# Patient Record
Sex: FEMALE | Race: WHITE | NOT HISPANIC OR LATINO | Employment: STUDENT | URBAN - METROPOLITAN AREA
[De-identification: names, ages, dates, MRNs, and addresses within clinical notes are randomized per-mention and may not be internally consistent; named-entity substitution may affect disease eponyms.]

---
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
## 2020-02-06 ENCOUNTER — COMMUNICATION - HEALTHEAST (OUTPATIENT)
Dept: TELEHEALTH | Facility: CLINIC | Age: 20
End: 2020-02-06

## 2020-02-06 ENCOUNTER — OFFICE VISIT - HEALTHEAST (OUTPATIENT)
Dept: FAMILY MEDICINE | Facility: CLINIC | Age: 20
End: 2020-02-06

## 2020-02-06 DIAGNOSIS — Z23 ENCOUNTER FOR ADMINISTRATION OF VACCINE: ICD-10-CM

## 2020-02-06 DIAGNOSIS — Z30.46 NEXPLANON REMOVAL: ICD-10-CM

## 2020-05-06 ENCOUNTER — COMMUNICATION - HEALTHEAST (OUTPATIENT)
Dept: PEDIATRICS | Facility: CLINIC | Age: 20
End: 2020-05-06

## 2020-05-06 DIAGNOSIS — Z23 NEED FOR HPV VACCINATION: ICD-10-CM

## 2020-05-07 ENCOUNTER — AMBULATORY - HEALTHEAST (OUTPATIENT)
Dept: NURSING | Facility: CLINIC | Age: 20
End: 2020-05-07

## 2020-05-07 DIAGNOSIS — Z23 NEED FOR HPV VACCINATION: ICD-10-CM

## 2020-05-11 ENCOUNTER — COMMUNICATION - HEALTHEAST (OUTPATIENT)
Dept: LAB | Facility: CLINIC | Age: 20
End: 2020-05-11

## 2020-05-11 DIAGNOSIS — N91.2 AMENORRHEA: ICD-10-CM

## 2020-05-14 ENCOUNTER — OFFICE VISIT - HEALTHEAST (OUTPATIENT)
Dept: FAMILY MEDICINE | Facility: CLINIC | Age: 20
End: 2020-05-14

## 2020-05-14 DIAGNOSIS — N91.2 AMENORRHEA: ICD-10-CM

## 2020-05-14 DIAGNOSIS — Z33.1 PREGNANCY, INCIDENTAL: ICD-10-CM

## 2020-05-14 LAB — HCG UR QL: POSITIVE

## 2020-06-17 ENCOUNTER — COMMUNICATION - HEALTHEAST (OUTPATIENT)
Dept: FAMILY MEDICINE | Facility: CLINIC | Age: 20
End: 2020-06-17

## 2020-06-17 DIAGNOSIS — N91.2 AMENORRHEA: ICD-10-CM

## 2020-06-18 ENCOUNTER — HOSPITAL ENCOUNTER (OUTPATIENT)
Dept: ULTRASOUND IMAGING | Facility: CLINIC | Age: 20
Discharge: HOME | End: 2020-06-18
Attending: FAMILY MEDICINE

## 2020-06-18 ENCOUNTER — COMMUNICATION - HEALTHEAST (OUTPATIENT)
Dept: FAMILY MEDICINE | Facility: CLINIC | Age: 20
End: 2020-06-18

## 2020-06-18 DIAGNOSIS — N91.2 AMENORRHEA: ICD-10-CM

## 2020-07-01 ENCOUNTER — PRENATAL OFFICE VISIT - HEALTHEAST (OUTPATIENT)
Dept: FAMILY MEDICINE | Facility: CLINIC | Age: 20
End: 2020-07-01

## 2020-07-01 DIAGNOSIS — Z34.01 ENCOUNTER FOR SUPERVISION OF NORMAL FIRST PREGNANCY IN FIRST TRIMESTER: ICD-10-CM

## 2020-07-01 DIAGNOSIS — Q51.9 UTERINE ANOMALY: ICD-10-CM

## 2020-07-01 LAB
ALBUMIN UR-MCNC: NEGATIVE MG/DL
APPEARANCE UR: CLEAR
BASOPHILS # BLD AUTO: 0.1 THOU/UL (ref 0–0.2)
BASOPHILS NFR BLD AUTO: 1 % (ref 0–2)
BILIRUB UR QL STRIP: NEGATIVE
COLOR UR AUTO: YELLOW
EOSINOPHIL # BLD AUTO: 0.2 THOU/UL (ref 0–0.4)
EOSINOPHIL NFR BLD AUTO: 1 % (ref 0–6)
ERYTHROCYTE [DISTWIDTH] IN BLOOD BY AUTOMATED COUNT: 13.3 % (ref 11–14.5)
GLUCOSE UR STRIP-MCNC: NEGATIVE MG/DL
HCT VFR BLD AUTO: 38.1 % (ref 35–47)
HGB BLD-MCNC: 12.7 G/DL (ref 12–16)
HGB UR QL STRIP: NEGATIVE
HIV 1+2 AB+HIV1 P24 AG SERPL QL IA: NEGATIVE
KETONES UR STRIP-MCNC: NEGATIVE MG/DL
LEUKOCYTE ESTERASE UR QL STRIP: NEGATIVE
LYMPHOCYTES # BLD AUTO: 2.5 THOU/UL (ref 0.8–4.4)
LYMPHOCYTES NFR BLD AUTO: 20 % (ref 20–40)
MCH RBC QN AUTO: 31.9 PG (ref 27–34)
MCHC RBC AUTO-ENTMCNC: 33.3 G/DL (ref 32–36)
MCV RBC AUTO: 96 FL (ref 80–100)
MONOCYTES # BLD AUTO: 0.7 THOU/UL (ref 0–0.9)
MONOCYTES NFR BLD AUTO: 5 % (ref 2–10)
NEUTROPHILS # BLD AUTO: 8.9 THOU/UL (ref 2–7.7)
NEUTROPHILS NFR BLD AUTO: 72 % (ref 50–70)
NITRATE UR QL: NEGATIVE
PH UR STRIP: 7 [PH] (ref 5–8)
PLATELET # BLD AUTO: 206 THOU/UL (ref 140–440)
PMV BLD AUTO: 12.8 FL (ref 8.5–12.5)
RBC # BLD AUTO: 3.98 MILL/UL (ref 3.8–5.4)
SP GR UR STRIP: 1.01 (ref 1–1.03)
UROBILINOGEN UR STRIP-ACNC: NORMAL
WBC: 12.4 THOU/UL (ref 4–11)

## 2020-07-01 ASSESSMENT — MIFFLIN-ST. JEOR: SCORE: 1449.68

## 2020-07-02 LAB
ABO/RH(D): NORMAL
ABORH REPEAT: NORMAL
ANTIBODY SCREEN: NEGATIVE
BACTERIA SPEC CULT: NO GROWTH
C TRACH DNA SPEC QL PROBE+SIG AMP: NEGATIVE
HBV SURFACE AG SERPL QL IA: NEGATIVE
N GONORRHOEA DNA SPEC QL NAA+PROBE: NEGATIVE
RUBV IGG SERPL QL IA: NEGATIVE
T PALLIDUM AB SER QL: NEGATIVE

## 2020-07-03 ENCOUNTER — AMBULATORY - HEALTHEAST (OUTPATIENT)
Dept: MATERNAL FETAL MEDICINE | Facility: HOSPITAL | Age: 20
End: 2020-07-03

## 2020-07-03 DIAGNOSIS — O34.01 BICORNUATE UTERUS AFFECTING PREGNANCY IN FIRST TRIMESTER, ANTEPARTUM: ICD-10-CM

## 2020-07-03 DIAGNOSIS — Q51.3 BICORNUATE UTERUS AFFECTING PREGNANCY IN FIRST TRIMESTER, ANTEPARTUM: ICD-10-CM

## 2020-07-14 ENCOUNTER — PRE VISIT (OUTPATIENT)
Dept: MATERNAL FETAL MEDICINE | Facility: CLINIC | Age: 20
End: 2020-07-14

## 2020-07-16 ENCOUNTER — HOSPITAL ENCOUNTER (OUTPATIENT)
Dept: ULTRASOUND IMAGING | Facility: CLINIC | Age: 20
End: 2020-07-16
Attending: OBSTETRICS & GYNECOLOGY
Payer: COMMERCIAL

## 2020-07-16 ENCOUNTER — RECORDS - HEALTHEAST (OUTPATIENT)
Dept: ADMINISTRATIVE | Facility: OTHER | Age: 20
End: 2020-07-16

## 2020-07-16 ENCOUNTER — OFFICE VISIT (OUTPATIENT)
Dept: MATERNAL FETAL MEDICINE | Facility: CLINIC | Age: 20
End: 2020-07-16
Attending: OBSTETRICS & GYNECOLOGY
Payer: COMMERCIAL

## 2020-07-16 DIAGNOSIS — O34.01 BICORNUATE UTERUS AFFECTING PREGNANCY IN FIRST TRIMESTER, ANTEPARTUM: Primary | ICD-10-CM

## 2020-07-16 DIAGNOSIS — Q51.3 BICORNUATE UTERUS AFFECTING PREGNANCY IN FIRST TRIMESTER, ANTEPARTUM: Primary | ICD-10-CM

## 2020-07-16 DIAGNOSIS — O26.90 PREGNANCY RELATED CONDITION, ANTEPARTUM: ICD-10-CM

## 2020-07-16 PROCEDURE — 76801 OB US < 14 WKS SINGLE FETUS: CPT

## 2020-07-16 NOTE — PROGRESS NOTES
"Please see \"Imaging\" tab under \"Chart Review\" for details of today's US at the [facility].    Ben Rice MD  Maternal-Fetal Medicine      "

## 2020-07-29 ENCOUNTER — PRENATAL OFFICE VISIT - HEALTHEAST (OUTPATIENT)
Dept: FAMILY MEDICINE | Facility: CLINIC | Age: 20
End: 2020-07-29

## 2020-07-29 ENCOUNTER — RECORDS - HEALTHEAST (OUTPATIENT)
Dept: ADMINISTRATIVE | Facility: OTHER | Age: 20
End: 2020-07-29

## 2020-07-29 DIAGNOSIS — Q51.9 UTERINE ANOMALY: ICD-10-CM

## 2020-07-29 DIAGNOSIS — Z34.02 ENCOUNTER FOR SUPERVISION OF NORMAL FIRST PREGNANCY IN SECOND TRIMESTER: ICD-10-CM

## 2020-07-31 LAB
# FETUSES US: NORMAL
AFP MOM SERPL: 1.43
AFP SERPL-MCNC: 44 NG/ML
AGE - REPORTED: 21 YR
CURRENT SMOKER: NO
FAMILY MEMBER DISEASES HX: NO
GA METHOD: NORMAL
GA: NORMAL WK
IDDM PATIENT QL: NO
INTEGRATED SCN PATIENT-IMP: NORMAL
SPECIMEN DRAWN SERPL: NORMAL

## 2020-08-06 ENCOUNTER — COMMUNICATION - HEALTHEAST (OUTPATIENT)
Dept: FAMILY MEDICINE | Facility: CLINIC | Age: 20
End: 2020-08-06

## 2020-08-21 ENCOUNTER — COMMUNICATION - HEALTHEAST (OUTPATIENT)
Dept: FAMILY MEDICINE | Facility: CLINIC | Age: 20
End: 2020-08-21

## 2020-08-26 ENCOUNTER — HOSPITAL ENCOUNTER (OUTPATIENT)
Dept: ULTRASOUND IMAGING | Facility: CLINIC | Age: 20
Discharge: HOME | End: 2020-08-26
Attending: FAMILY MEDICINE

## 2020-08-26 ENCOUNTER — PRENATAL OFFICE VISIT - HEALTHEAST (OUTPATIENT)
Dept: FAMILY MEDICINE | Facility: CLINIC | Age: 20
End: 2020-08-26

## 2020-08-26 DIAGNOSIS — Z34.02 ENCOUNTER FOR SUPERVISION OF NORMAL FIRST PREGNANCY IN SECOND TRIMESTER: ICD-10-CM

## 2020-08-26 DIAGNOSIS — Q51.9 UTERINE ANOMALY: ICD-10-CM

## 2020-08-26 DIAGNOSIS — J30.1 SEASONAL ALLERGIC RHINITIS DUE TO POLLEN: ICD-10-CM

## 2020-08-27 ENCOUNTER — AMBULATORY - HEALTHEAST (OUTPATIENT)
Dept: FAMILY MEDICINE | Facility: CLINIC | Age: 20
End: 2020-08-27

## 2020-08-27 DIAGNOSIS — Q51.9 UTERINE ANOMALY: ICD-10-CM

## 2020-08-27 DIAGNOSIS — Z36.89 ENCOUNTER FOR FETAL ANATOMIC SURVEY: ICD-10-CM

## 2020-09-04 ENCOUNTER — COMMUNICATION - HEALTHEAST (OUTPATIENT)
Dept: SCHEDULING | Facility: CLINIC | Age: 20
End: 2020-09-04

## 2020-09-04 ENCOUNTER — OFFICE VISIT - HEALTHEAST (OUTPATIENT)
Dept: FAMILY MEDICINE | Facility: CLINIC | Age: 20
End: 2020-09-04

## 2020-09-04 DIAGNOSIS — O21.9 NAUSEA AND VOMITING DURING PREGNANCY: ICD-10-CM

## 2020-09-04 DIAGNOSIS — R10.84 GENERALIZED ABDOMINAL PAIN: ICD-10-CM

## 2020-09-04 LAB
ALBUMIN UR-MCNC: NEGATIVE MG/DL
ALT SERPL W P-5'-P-CCNC: 14 U/L (ref 0–45)
ANION GAP SERPL CALCULATED.3IONS-SCNC: 9 MMOL/L (ref 5–18)
APPEARANCE UR: CLEAR
APTT PPP: 25 SECONDS (ref 24–37)
AST SERPL W P-5'-P-CCNC: 18 U/L (ref 0–40)
BILIRUB UR QL STRIP: NEGATIVE
BUN SERPL-MCNC: 4 MG/DL (ref 8–22)
CALCIUM SERPL-MCNC: 8.9 MG/DL (ref 8.5–10.5)
CHLORIDE BLD-SCNC: 105 MMOL/L (ref 98–107)
CO2 SERPL-SCNC: 24 MMOL/L (ref 22–31)
COLOR UR AUTO: YELLOW
CREAT SERPL-MCNC: 0.6 MG/DL (ref 0.6–1.1)
CREAT UR-MCNC: 146.2 MG/DL
ERYTHROCYTE [DISTWIDTH] IN BLOOD BY AUTOMATED COUNT: 11.7 % (ref 11–14.5)
GFR SERPL CREATININE-BSD FRML MDRD: >60 ML/MIN/1.73M2
GLUCOSE BLD-MCNC: 85 MG/DL (ref 70–125)
GLUCOSE UR STRIP-MCNC: NEGATIVE MG/DL
HCT VFR BLD AUTO: 37.6 % (ref 35–47)
HGB BLD-MCNC: 12.7 G/DL (ref 12–16)
HGB UR QL STRIP: NEGATIVE
INR PPP: 0.96 (ref 0.9–1.1)
KETONES UR STRIP-MCNC: NEGATIVE MG/DL
LEUKOCYTE ESTERASE UR QL STRIP: NEGATIVE
LIPASE SERPL-CCNC: 20 U/L (ref 0–52)
MCH RBC QN AUTO: 32.7 PG (ref 27–34)
MCHC RBC AUTO-ENTMCNC: 33.7 G/DL (ref 32–36)
MCV RBC AUTO: 97 FL (ref 80–100)
NITRATE UR QL: NEGATIVE
PH UR STRIP: 7 [PH] (ref 5–8)
PLATELET # BLD AUTO: 234 THOU/UL (ref 140–440)
PMV BLD AUTO: 8.5 FL (ref 7–10)
POTASSIUM BLD-SCNC: 3.7 MMOL/L (ref 3.5–5)
PROTEIN, RANDOM URINE - HISTORICAL: 9 MG/DL
PROTEIN/CREAT RATIO, RANDOM UR: 0.06
RBC # BLD AUTO: 3.87 MILL/UL (ref 3.8–5.4)
SODIUM SERPL-SCNC: 138 MMOL/L (ref 136–145)
SP GR UR STRIP: 1.02 (ref 1–1.03)
URATE SERPL-MCNC: 4.1 MG/DL (ref 2–7.5)
UROBILINOGEN UR STRIP-ACNC: ABNORMAL
WBC: 11.7 THOU/UL (ref 4–11)

## 2020-09-14 ENCOUNTER — HOSPITAL ENCOUNTER (OUTPATIENT)
Dept: ULTRASOUND IMAGING | Facility: CLINIC | Age: 20
Discharge: HOME | End: 2020-09-14
Attending: FAMILY MEDICINE

## 2020-09-14 DIAGNOSIS — Q51.9 UTERINE ANOMALY: ICD-10-CM

## 2020-09-14 DIAGNOSIS — Z36.89 ENCOUNTER FOR FETAL ANATOMIC SURVEY: ICD-10-CM

## 2020-09-28 ENCOUNTER — PRENATAL OFFICE VISIT - HEALTHEAST (OUTPATIENT)
Dept: FAMILY MEDICINE | Facility: CLINIC | Age: 20
End: 2020-09-28

## 2020-09-28 DIAGNOSIS — Z34.02 ENCOUNTER FOR SUPERVISION OF NORMAL FIRST PREGNANCY IN SECOND TRIMESTER: ICD-10-CM

## 2020-10-14 ENCOUNTER — COMMUNICATION - HEALTHEAST (OUTPATIENT)
Dept: FAMILY MEDICINE | Facility: CLINIC | Age: 20
End: 2020-10-14

## 2020-10-14 DIAGNOSIS — Q51.9 UTERINE ANOMALY: ICD-10-CM

## 2020-10-21 ENCOUNTER — HOSPITAL ENCOUNTER (OUTPATIENT)
Dept: ULTRASOUND IMAGING | Facility: CLINIC | Age: 20
Discharge: HOME | End: 2020-10-21
Attending: FAMILY MEDICINE

## 2020-10-21 ENCOUNTER — RECORDS - HEALTHEAST (OUTPATIENT)
Dept: FAMILY MEDICINE | Facility: CLINIC | Age: 20
End: 2020-10-21

## 2020-10-21 DIAGNOSIS — Q51.9 UTERINE ANOMALY: ICD-10-CM

## 2020-11-03 ENCOUNTER — OFFICE VISIT - HEALTHEAST (OUTPATIENT)
Dept: FAMILY MEDICINE | Facility: CLINIC | Age: 20
End: 2020-11-03

## 2020-11-03 ENCOUNTER — COMMUNICATION - HEALTHEAST (OUTPATIENT)
Dept: FAMILY MEDICINE | Facility: CLINIC | Age: 20
End: 2020-11-03

## 2020-11-03 DIAGNOSIS — Q51.9 UTERINE ANOMALY: ICD-10-CM

## 2020-11-03 DIAGNOSIS — Z34.03 ENCOUNTER FOR SUPERVISION OF NORMAL FIRST PREGNANCY IN THIRD TRIMESTER: ICD-10-CM

## 2020-11-03 DIAGNOSIS — Z67.91 RH NEGATIVE STATE IN ANTEPARTUM PERIOD: ICD-10-CM

## 2020-11-03 DIAGNOSIS — O26.899 RH NEGATIVE STATE IN ANTEPARTUM PERIOD: ICD-10-CM

## 2020-11-03 LAB
ERYTHROCYTE [DISTWIDTH] IN BLOOD BY AUTOMATED COUNT: 10.3 % (ref 11–14.5)
FASTING STATUS PATIENT QL REPORTED: NORMAL
GLUCOSE 1H P 50 G GLC PO SERPL-MCNC: 116 MG/DL (ref 70–139)
HCT VFR BLD AUTO: 31.9 % (ref 35–47)
HGB BLD-MCNC: 10.8 G/DL (ref 12–16)
MCH RBC QN AUTO: 31.3 PG (ref 27–34)
MCHC RBC AUTO-ENTMCNC: 33.9 G/DL (ref 32–36)
MCV RBC AUTO: 92 FL (ref 80–100)
PLATELET # BLD AUTO: 234 THOU/UL (ref 140–440)
PMV BLD AUTO: 9.2 FL (ref 7–10)
RBC # BLD AUTO: 3.45 MILL/UL (ref 3.8–5.4)
WBC: 11.4 THOU/UL (ref 4–11)

## 2020-11-04 LAB — T PALLIDUM AB SER QL: NEGATIVE

## 2020-11-07 ENCOUNTER — COMMUNICATION - HEALTHEAST (OUTPATIENT)
Dept: FAMILY MEDICINE | Facility: CLINIC | Age: 20
End: 2020-11-07

## 2020-11-19 ENCOUNTER — COMMUNICATION - HEALTHEAST (OUTPATIENT)
Dept: FAMILY MEDICINE | Facility: CLINIC | Age: 20
End: 2020-11-19

## 2020-11-20 ENCOUNTER — PRENATAL OFFICE VISIT - HEALTHEAST (OUTPATIENT)
Dept: FAMILY MEDICINE | Facility: CLINIC | Age: 20
End: 2020-11-20

## 2020-11-20 DIAGNOSIS — N89.8 VAGINAL DISCHARGE: ICD-10-CM

## 2020-11-20 DIAGNOSIS — Q51.9 UTERINE ANOMALY: ICD-10-CM

## 2020-11-20 DIAGNOSIS — O26.899 RH NEGATIVE STATE IN ANTEPARTUM PERIOD: ICD-10-CM

## 2020-11-20 DIAGNOSIS — Z67.91 RH NEGATIVE STATE IN ANTEPARTUM PERIOD: ICD-10-CM

## 2020-11-20 DIAGNOSIS — Z34.03 ENCOUNTER FOR SUPERVISION OF NORMAL FIRST PREGNANCY IN THIRD TRIMESTER: ICD-10-CM

## 2020-11-20 LAB
CLUE CELLS: ABNORMAL
TRICHOMONAS, WET PREP: ABNORMAL
YEAST, WET PREP: ABNORMAL

## 2020-11-21 ENCOUNTER — COMMUNICATION - HEALTHEAST (OUTPATIENT)
Dept: FAMILY MEDICINE | Facility: CLINIC | Age: 20
End: 2020-11-21

## 2020-11-22 ENCOUNTER — AMBULATORY - HEALTHEAST (OUTPATIENT)
Dept: FAMILY MEDICINE | Facility: CLINIC | Age: 20
End: 2020-11-22

## 2020-11-22 DIAGNOSIS — B96.89 BACTERIAL VAGINOSIS IN PREGNANCY: ICD-10-CM

## 2020-11-22 DIAGNOSIS — O23.599 BACTERIAL VAGINOSIS IN PREGNANCY: ICD-10-CM

## 2020-12-03 ENCOUNTER — HOSPITAL ENCOUNTER (OUTPATIENT)
Dept: ULTRASOUND IMAGING | Facility: CLINIC | Age: 20
Discharge: HOME | End: 2020-12-03
Attending: FAMILY MEDICINE

## 2020-12-03 DIAGNOSIS — Q51.9 UTERINE ANOMALY: ICD-10-CM

## 2020-12-04 ENCOUNTER — PRENATAL OFFICE VISIT - HEALTHEAST (OUTPATIENT)
Dept: FAMILY MEDICINE | Facility: CLINIC | Age: 20
End: 2020-12-04

## 2020-12-04 DIAGNOSIS — R93.89 ABNORMAL ULTRASOUND: ICD-10-CM

## 2020-12-04 DIAGNOSIS — O36.5990 FETAL GROWTH RETARDATION, ANTEPARTUM: ICD-10-CM

## 2020-12-04 DIAGNOSIS — O26.899 RH NEGATIVE STATE IN ANTEPARTUM PERIOD: ICD-10-CM

## 2020-12-04 DIAGNOSIS — Z34.03 ENCOUNTER FOR SUPERVISION OF NORMAL FIRST PREGNANCY IN THIRD TRIMESTER: ICD-10-CM

## 2020-12-04 DIAGNOSIS — Z67.91 RH NEGATIVE STATE IN ANTEPARTUM PERIOD: ICD-10-CM

## 2020-12-04 DIAGNOSIS — Q51.9 UTERINE ANOMALY: ICD-10-CM

## 2020-12-07 ENCOUNTER — AMBULATORY - HEALTHEAST (OUTPATIENT)
Dept: MATERNAL FETAL MEDICINE | Facility: HOSPITAL | Age: 20
End: 2020-12-07

## 2020-12-07 DIAGNOSIS — O26.90 PREGNANCY, ANTEPARTUM, COMPLICATIONS: ICD-10-CM

## 2020-12-09 ENCOUNTER — COMMUNICATION - HEALTHEAST (OUTPATIENT)
Dept: FAMILY MEDICINE | Facility: CLINIC | Age: 20
End: 2020-12-09

## 2020-12-09 DIAGNOSIS — R12 HEARTBURN: ICD-10-CM

## 2020-12-09 DIAGNOSIS — O23.599 BACTERIAL VAGINOSIS IN PREGNANCY: ICD-10-CM

## 2020-12-09 DIAGNOSIS — B96.89 BACTERIAL VAGINOSIS IN PREGNANCY: ICD-10-CM

## 2020-12-09 RX ORDER — FAMOTIDINE 20 MG/1
20 TABLET, FILM COATED ORAL 2 TIMES DAILY
Qty: 60 TABLET | Refills: 1 | Status: SHIPPED | OUTPATIENT
Start: 2020-12-09

## 2020-12-10 ENCOUNTER — OFFICE VISIT - HEALTHEAST (OUTPATIENT)
Dept: MATERNAL FETAL MEDICINE | Facility: HOSPITAL | Age: 20
End: 2020-12-10

## 2020-12-10 ENCOUNTER — RECORDS - HEALTHEAST (OUTPATIENT)
Dept: ADMINISTRATIVE | Facility: OTHER | Age: 20
End: 2020-12-10

## 2020-12-10 ENCOUNTER — RECORDS - HEALTHEAST (OUTPATIENT)
Dept: ULTRASOUND IMAGING | Facility: HOSPITAL | Age: 20
End: 2020-12-10

## 2020-12-10 DIAGNOSIS — Q51.3 BICORNATE UTERUS COMPLICATING PREGNANCY, THIRD TRIMESTER: ICD-10-CM

## 2020-12-10 DIAGNOSIS — O26.90 PREGNANCY RELATED CONDITIONS, UNSPECIFIED, UNSPECIFIED TRIMESTER: ICD-10-CM

## 2020-12-10 DIAGNOSIS — O34.03 BICORNATE UTERUS COMPLICATING PREGNANCY, THIRD TRIMESTER: ICD-10-CM

## 2020-12-15 ENCOUNTER — COMMUNICATION - HEALTHEAST (OUTPATIENT)
Dept: FAMILY MEDICINE | Facility: CLINIC | Age: 20
End: 2020-12-15

## 2021-05-30 VITALS — WEIGHT: 142 LBS | HEIGHT: 64 IN | BODY MASS INDEX: 24.24 KG/M2

## 2021-05-31 VITALS — HEIGHT: 65 IN | BODY MASS INDEX: 26.76 KG/M2 | WEIGHT: 160.6 LBS

## 2021-05-31 VITALS — WEIGHT: 164 LBS | HEIGHT: 65 IN | BODY MASS INDEX: 27.32 KG/M2

## 2021-05-31 VITALS — HEIGHT: 65 IN | BODY MASS INDEX: 23.46 KG/M2 | WEIGHT: 140.8 LBS

## 2021-06-02 ENCOUNTER — RECORDS - HEALTHEAST (OUTPATIENT)
Dept: ADMINISTRATIVE | Facility: CLINIC | Age: 21
End: 2021-06-02

## 2021-06-04 VITALS
HEART RATE: 64 BPM | DIASTOLIC BLOOD PRESSURE: 72 MMHG | WEIGHT: 151 LBS | SYSTOLIC BLOOD PRESSURE: 100 MMHG | BODY MASS INDEX: 25.52 KG/M2

## 2021-06-04 VITALS
WEIGHT: 158 LBS | DIASTOLIC BLOOD PRESSURE: 58 MMHG | SYSTOLIC BLOOD PRESSURE: 122 MMHG | BODY MASS INDEX: 26.7 KG/M2 | HEART RATE: 96 BPM

## 2021-06-04 VITALS
HEART RATE: 76 BPM | BODY MASS INDEX: 27.36 KG/M2 | DIASTOLIC BLOOD PRESSURE: 64 MMHG | SYSTOLIC BLOOD PRESSURE: 110 MMHG | WEIGHT: 161.9 LBS

## 2021-06-04 VITALS
HEART RATE: 90 BPM | SYSTOLIC BLOOD PRESSURE: 119 MMHG | WEIGHT: 140 LBS | BODY MASS INDEX: 23.66 KG/M2 | DIASTOLIC BLOOD PRESSURE: 72 MMHG | OXYGEN SATURATION: 99 %

## 2021-06-04 VITALS
HEART RATE: 84 BPM | SYSTOLIC BLOOD PRESSURE: 92 MMHG | BODY MASS INDEX: 26.52 KG/M2 | DIASTOLIC BLOOD PRESSURE: 62 MMHG | WEIGHT: 156.9 LBS

## 2021-06-04 VITALS
HEART RATE: 76 BPM | DIASTOLIC BLOOD PRESSURE: 56 MMHG | SYSTOLIC BLOOD PRESSURE: 102 MMHG | BODY MASS INDEX: 26.16 KG/M2 | WEIGHT: 154.8 LBS

## 2021-06-04 VITALS
BODY MASS INDEX: 25.41 KG/M2 | HEART RATE: 72 BPM | DIASTOLIC BLOOD PRESSURE: 68 MMHG | SYSTOLIC BLOOD PRESSURE: 118 MMHG | HEIGHT: 65 IN | WEIGHT: 152.5 LBS

## 2021-06-05 VITALS
SYSTOLIC BLOOD PRESSURE: 116 MMHG | BODY MASS INDEX: 30.88 KG/M2 | HEART RATE: 96 BPM | DIASTOLIC BLOOD PRESSURE: 62 MMHG | WEIGHT: 182.7 LBS

## 2021-06-05 VITALS
SYSTOLIC BLOOD PRESSURE: 102 MMHG | HEART RATE: 84 BPM | WEIGHT: 177 LBS | BODY MASS INDEX: 29.91 KG/M2 | DIASTOLIC BLOOD PRESSURE: 64 MMHG

## 2021-06-05 NOTE — PROGRESS NOTES
Kat Wing is a 20 y.o. female here for Nexplanon removal.  It was inserted November 2017.  It is due for removal.  She is not interested in alternatives.  Not sexually active  Also requesting influenza and HPV vaccines    Vitals:    02/06/20 1301   BP: 100/72   Pulse: 64        Procedure note:  Counselling was done.  Side effects of Nexplanon again were reviewed with the patient.  Removal procedure techniques were reviewed with the patient.  Patient still desires for its removal.  Informed consent was obtained.  The area of the arm was cleaned with alcohol swab then anesthetized with lidocaine with epineprine.  A small incision was made with an 11 inch blade.  Then Nexplanon was palpated, held by a curved hemostat, and expressed outward towards the incision site.  The Nexplanon was completely removed.  Patient tolerated the procedure.  There were no complications.  Wound was dressed with antibiotic ointment and bandage.  We discussed alternative contraceptives and she is undecided at this point.

## 2021-06-08 NOTE — TELEPHONE ENCOUNTER
[facility] has a lab only appointment at [facility] today.  Please place orders ASAP if lab appointment is appropriate.  Thank you!

## 2021-06-08 NOTE — TELEPHONE ENCOUNTER
Reached out to patient and left a message to return phone call. When patient calls back, please see the below message and assist patient with scheduling an office visit with Dr. Campos. Also, please cancel the lab only and video visit. Thank you, Arianna Guerra

## 2021-06-08 NOTE — TELEPHONE ENCOUNTER
Thank you for coordinating this plan that we talked about. U/s in place. She can call the radiology department at [phone] to schedule her imaging test.

## 2021-06-08 NOTE — PROGRESS NOTES
Assessment/Plan:        Diagnoses and all orders for this visit:    Pregnancy, incidental  -     prenatal 93-iron-folate 9-dha 31 mg iron- 1 mg-200 mg cap; Take 1 capsule by mouth daily.  Dispense: 90 capsule; Refill: 3    Amenorrhea  -     Pregnancy, Urine    Other orders  -     UNABLE TO FIND; rootology for allergies  Discussed symptoms to watch for and inform us if it happens including PV bleeding or worse cramping.Will have see an OBGYN provider for prenatal care. She will be started on prenatal vitamins. Encouraged to stop the supplement except if she finds it is safe for pregnancy.   LMP was  04/02/20and her DEANN was 01/07/2021 with GA at 6 weeks today.        Subjective:    Patient ID: Kat Wing is a 20 y.o. female.    Comes wanting to confirm pregnancy. Noted last menstrual bleeding was April 2/20. She has no major symptoms at this time except for improving mild cramping and nausea with no vomiting. Has increased urination and frequency but no dysuria. Has no back pain. Good appetite.Pregnancy was expected.  Has history of allergy and uses a supplement called Rootology.      The following portions of the patient's history were reviewed and updated as appropriate: allergies, current medications, past family history, past medical history, past social history, past surgical history and problem list.    Review of Systems   Constitutional: Negative.    HENT: Positive for rhinorrhea. Negative for sinus pressure, sinus pain and sore throat.    Respiratory: Negative for cough and wheezing.    Neurological: Negative for light-headedness and headaches.   Psychiatric/Behavioral: Negative.      Vitals:    05/14/20 1513   BP: 119/72   Pulse: 90   SpO2: 99%   Weight: 140 lb (63.5 kg)             Objective:    Physical Exam   Constitutional: She is oriented to person, place, and time. She appears well-developed and well-nourished. No distress.   HENT:   Head: Normocephalic.   Cardiovascular: Intact distal pulses.    Neurological: She is alert and oriented to person, place, and time.   Skin: Skin is warm.

## 2021-06-08 NOTE — TELEPHONE ENCOUNTER
[facility] scheduled patient for a pregnancy confirmation with you on 05/12/2020. Patient requested a lab only visit today 05/11/2020 to leave a urine sample for a UPT. Please advise. Thank you, Arianna Guerra

## 2021-06-08 NOTE — TELEPHONE ENCOUNTER
Patient unable to do video visit due to being resident of [place]. Cancelled today's appointment and rescheduled the 1st OB to 7/1/2020 when Dr. Kapadia is back in clinic. She would still like to get a dating ultrasound done. I will send her the phone number to call and send her the address to the clinic for her 7/1 appointment so she comes to the correct clinic. Please place order for dating US.   Rosario Kohli LPN

## 2021-06-09 NOTE — PROGRESS NOTES
First OB Visit     ASSESSMENT/PLAN    11w5d by 1st tri ultrasound with DEANN 1/15/2021 which is discordant from apprx LMP dating     1. Encounter for supervision of normal first pregnancy in first trimester  Single mom, young but good family supports; she is declining care coordination at this time.  Consdering the Progenity testing  Routine prenatal labs today.  Pap smear is not yet indicated, age <21    - ABO/RH Typing (OP order)  - Hepatitis B Surface antigen (HBsAG)  - HIV Antigen/Antibody Screening [facility]  - HM1(CBC and Differential)  - HML Antibody Screen  - RPR  - Rubella Immune Status (IgG)  - Urinalysis Macroscopic  - Culture, Urine  - Chlamydia/gonorrhoeae, URINE  - HM1 (CBC with Diff)    2. 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  - Amb[facility]- Pregnancy      Referral(s):   None, declines referral for clinic care coordination/       Discussed:  - Pre-pregnancy Body mass index is 25.77 kg/m .  - Recommended weight gain of  25-35 lbs for normal BMI.  - Influenza vaccine to be done this fall  - Genetic screening options, including false positive rate of 5% with screening tests and diagnostic options (chorionic villus sampling, amniocentesis), and patient is going to research a little more.  - Safe medications during pregnancy and prenatal vitamin daily discussed.   - Healthy habits including not using tobacco or alcohol, exercising regularly and maintaining healthy diet  - Information given on tips for dealing with nausea, healthy habits, exposures, safety, prenatal appointment schedule, and when to call the doctor.  - Recommendations for breastfeeding given     Follow up in 4 weeks for routine pre- care.     Cora Kapadia MD        SUBJECTIVE:  Kat Presleygary is a 20 y.o.  female who presents to clinic  for a new OB visit.    Patient's last menstrual period was 2020 (within days).  Estimated Date of Delivery: 1/15/21 by 9w6d ultrasound, discordant from LMP    11w5d today    Unplanned pregnancy, she was on an OCP- thinks she missed one dose.    FOB is Alexys, 24yo (). Dating for 1year off and on. Now her ex-boyfriend. His family (parents) are aware and supportive. Her own family (parents, sisters, aunt/uncle) is also supportive.    She is a caretaker for people with mental disabilities.     She has not had any bleeding, abdominal pain or cramping since her LMP. She has  had mild nausea. No vomiting.  Weight loss has not occurred.     OTHER CONCERNS: none    Her obstetrical history is significant for none.   Relationship with FOB: ex boyfriend, not living together.   Patient does intend to breast feed.   Pregnancy history fully reviewed.    OB History    Para Term  AB Living   1             SAB TAB Ectopic Multiple Live Births                  # Outcome Date GA Lbr Rc/2nd Weight Sex Delivery Anes PTL Lv   1 Current                Social History     Social History Narrative     Not on file       Active Ambulatory Problems     Diagnosis Date Noted     Multiple Cranial Nerve Palsies      Allergic Rhinitis      Esophageal Reflux      Resolved Ambulatory Problems     Diagnosis Date Noted     Ankle Joint Pain      Ankle Sprain      No Additional Past Medical History       The following portions of the patient's history were reviewed and updated as appropriate: allergies, current medications, past family history, past medical history, past social history, past surgical history and problem list.    Review of Systems  A 12 point comprehensive review of systems was negative except as noted.      IMAGING:  EXAM: US OB < 14 WEEKS  LOCATION: [facility]  DATE/TIME: 2020 3:55 PM     INDICATION: Confirmed dating; LMP around 2020.  COMPARISON: None.  TECHNIQUE: Transabdominal scans were  "performed.     FINDINGS:  UTERUS: Single normal-appearing intrauterine gestation sac located towards the right cornu, although myometrium is seen surrounding the gestational sac. There is question of a right coronoid configuration or partially septate uterus.  CRL: measures 3.0 cm, equals 7 weeks 6 days.  FETAL HEART RATE: 170 bpm.  AMNIOTIC FLUID: Normal.  PLACENTA: Not yet formed. No evidence for sub-chorionic hemorrhage.     RIGHT OVARY: Normal.  LEFT OVARY: Normal.     IMPRESSION:   1.  Single living intrauterine gestation at 9 weeks 6 days, EDC January 15, 2021.  2.  Question bicornuate versus partially septate uterus with the intrauterine gestation towards the right horn. Consider short interval follow-up with repeat ultrasound in 2 weeks.      PHYSICAL EXAM:  /68 (Patient Site: Left Arm, Patient Position: Sitting, Cuff Size: Adult Regular)   Pulse 72   Ht 5' 4.5\" (1.638 m)   Wt 152 lb 8 oz (69.2 kg)   LMP 04/02/2020 (Within Days)   BMI 25.77 kg/m     GENERAL: Pleasant pregnant female, alert, well groomed.  SKIN: Warm and dry, without lesions or rashes  EYES: PERRLA, EOM intact  MOUTH: Buccal mucosa pink, moist without lesions.   NECK: Thyroid without enlargement and nodules. No cervical lymphadenopathy.  LUNGS: Clear to auscultation.  BREAST: declined  HEART: RRR without murmur.  ABDOMEN: Soft without masses , tenderness or organomegaly. No CVA tenderness. Fundus palpable at symphysis pubis. FHT visualized with bedside ultrasound along with fetal movement.  MUSCULOSKELETAL: Full range of motion.  EXTREMITIES: No edema. No significant varicosities.   : declined    Cora Kapadia MD            "

## 2021-06-09 NOTE — PATIENT INSTRUCTIONS - HE
1.  a generic prenatal vitamin at the pharmacy. It will have iron, 400mcg folic acid, and ideally also omega fatty acids for brain development.  2. Let us know if you decide you want the Progenity genetic blood test  3. Let us know if you do not hear back from the [facility] regarding our plan to get another ultrasound of your uterus to better review the shape.     It was nice to meet you today! Cora Kapadia MD          Patient Education   HEALTHY PREGNANCY CARE: 6 to 10 WEEKS PREGNANT    Pregnancy is an important time for you to take care of yourself and your baby. There is much that you can do through simple things like nutrition and exercise that will help you achieve the best outcome possible.     Learn about the changes you and your baby will experience during pregnancy. Your baby's facial features, brain, spinal cord and internal organs are developing, and baby's heart is pumping blood. Due to hormonal changes, you may notice nausea, fatigue or breast tenderness.    Common Discomforts of Early Pregnancy  Your body goes through many changes during pregnancy. Some are noticeable like increased breast size or darkening of the color of the nipple, but some changes may cause discomfort like breast tenderness, urinary frequency, fatigue or nausea. If you have questions about the duration or severity of what you are experiencing, contact your midwife or physician for guidance.     Coping with nausea/morning sickness    Sip small amounts of water, juices, or shakes. Try drinking between meals, not with meals.     Eat 5 or 6 small meals a day. Try dry toast, crackers, or cereal when you first get up, and eat breakfast a little later.    Make lelia tea (sliced lelia root in hot water with honey). Sip a cup in the morning before getting up.    Avoid spicy, greasy, and fatty foods.     When you feel sick, open your windows or go for a short walk to get fresh air.     Try nausea  wristbands. These help some women.     Call your midwife or physician if you cannot keep fluids down, or if vomiting persists. There are medications that can help.    Choose healthy foods and gain the recommended amount of weight for your size. If you have questions or follow a special diet, talk with your midwife or physician. You should take one prenatal vitamin daily.  If nausea is a problem, try taking only a folic acid supplement of 400-800mcg daily until the nausea passes.    Follow safe guidelines for exercise. Low impact aerobic activities are generally okay during pregnancy. If you have a regular exercise routine, you should be able to continue it during pregnancy as long as it doesn't cause pain. Talk to your midwife or physician about your activity at your prenatal visits.    You can sign up for a weekly parenting e-mail that gives support, tips and advice from health care professionals that starts with pregnancy and continues through the toddler years. To register, go to [email or web address] at any time during your pregnancy.    Things to Avoid During Pregnancy  A general principal to follow during pregnancy is to stay away from anything that is strong/bad smelling (gas, paint, fumes, etc), or known to cause problems for mom or baby    Smoking (self or others)    Alcohol     Pesticides    Caffeine     Soft cheeses    Fish with high mercury content (such as shark, swordfish, rupinder mackerel, or tilefish)    Some over the counter meds (ask your midwife or physician before taking)    Changing the gale litter box    This is also a good time to think about genetic screening tests. These are tests done during pregnancy to look for possible problems with the baby. First trimester tests for Down's Syndrome, Trisomy 13 and 18 can be done as early as 10 weeks of pregnancy. Some testing can be done as late as 22 weeks of pregnancy, depending on the test. There are other tests that look for spinal defects, cystic  fibrosis, Sriram-Sachs disease. Talk with your midwife or physician about testing.    Warning Signs    Watch for warning signs, such as     vaginal bleeding    fluid leaking from your vagina    severe abdominal pain    nausea and vomiting more than 4-5 times a day, or if you are unable to keep anything down    fever more than 100.4 degrees F.     Contact your midwife or physician at [facility] FAM[facility]ICINE/OB at Phone: [phone] if you have these or any other concerns. If it's after clinic hours, physician patients should call the [facility] at [phone] ([phone]); midwife patients should call their answering service at [phone].    How can you care for yourself at home?   You can refer to the Starting Out Right book or find it online at [email or web address]-[email or web address] or [email or web address]/images/[email or web address]/[email or web address]-[email or web address]-out-[email or web address]#p=8        Patient Education   HEALTHY PREGNANCY CARE: 10-14 WEEKS PREGNANT     By weeks 10 to 14 of your pregnancy, the placenta has formed inside your uterus. It may be possible to hear your baby's heartbeat with a doppler ultrasound device. Your baby's eyes can and do move. The arms and legs can bend.    The second trimester genetic screening tests for Down's Syndrome, Trisomy 18, and neural tube defects (which are known collectively as a quad screen) are done at 15 to 22 weeks. It's your choice whether to have these tests. You and your partner can talk to your midwife or physician about birth defects tests.    Consider breastfeeding for the healthiest way to feed your baby. Ask your midwife or physician for more information.     As your center of gravity and weight changes, use good body mechanics when changing positions and lifting. For example, use a straight back and your legs for support when lifting instead of bending over. Maintain good posture to  prevent straining your muscles. Now is a good time to continue or restart your exercise program. Walking 30-60 minutes daily is an excellent way to keep fit. Yoga and swimming also offer many benefits.    The nausea and fatigue of early pregnancy have usually started to let up, so this is a good time to focus on nutrition. Consider attending a nutrition class. A healthy diet includes about 60 grams of protein each day (3-4 servings of dairy, 2-3 servings of meat/fish/poultry/nuts), 4-6 servings of whole grain foods, and 5-6 servings of fruits and vegetables. Remember to drink 6-8 glasses of water daily.    Watch for warning signs, such as     vaginal bleeding    fluid leaking from your vagina    severe abdominal pain    nausea and vomiting more than 4-5 times a day, or if you are unable to keep anything down    fever more than 100.4 degrees F.     Contact your midwife or physician at [facility] FAMILY MEDICINE/OB at Phone: [phone] if you have these or any other concerns. If it's after clinic hours, physician patients should call the [facility] at [phone]); midwife patients should call their answering service at [phone].    How can you care for yourself at home?   You can refer to the Starting Out Right book or find it online at [email or web address]/stories/[email or web address]-[email or web address] or [email or web address].[email or web address]/images/stories/flipbooks/[facility]-starting-out-right/[facility]-starting-out-right.html#p=8  You can sign up for a weekly parenting e-mail that gives support, tips and advice from health care professionals that starts with pregnancy and continues through the toddler years. To register, go to [email or web address] at any time during your pregnancy.

## 2021-06-10 NOTE — TELEPHONE ENCOUNTER
Called pt to try and left brief VM to call back.    Please relay message if she calls:     You are going to have a baby GIRL!!  I want to also give you the good news that your Progenity genetic blood test was NORMAL and so was the pre-parent carrier screening!   This puts your current pregnancy at low risk for Down syndrome, trisomy 18, trisomy 13 and sex chromosome abnormalities. This test is reported to have the following sensitivities: Down syndrome: 99%, trisomy 18: 98%, and trisomy 13: 98%. While this result is reassuring, it does not rule out all possible genetic conditions. There is not currently one single genetic test available that can assess for all possible genetic conditions.       Thanks,  Cora Kapadia MD

## 2021-06-10 NOTE — PROGRESS NOTES
"Chief complaint: Allergy follow-up    History of present illness: This is a pleasant 17-year-old girl here with her mom for follow-up of her allergies.  She reports that she takes montelukast and Allegra as needed.  She does not use nasal sprays or rinses.  Mom states she got a bloody nose when she is nasal sprays in the past.  No cough, wheeze or shortness of breath.  Her nasal congestion has been increased since she has been out of her montelukast.    Past medical history, social history, family medical history, meds and allergies reviewed and updated accordingly.    Review of Systems performed as above and the remainder is negative.      Current Outpatient Prescriptions:      etonogestrel 68 mg Impl, Use as directed in the clinic today, Disp: 1 each, Rfl: 0     montelukast (SINGULAIR) 10 mg tablet, Take 1 tablet (10 mg total) by mouth at bedtime., Disp: 90 tablet, Rfl: 3    No Known Allergies    Resp 18  Ht 5' 4\" (1.626 m)  Wt 142 lb (64.4 kg)  BMI 24.37 kg/m2  Gen: Pleasant female not in acute distress  HEENT: Eyes no erythema of the bulbar or palpebral conjunctiva, no edema. Ears: TMs well visualized, no effusions. Nose: Inferior turbinates congested, pale mucosa, vestibulitis mouth: Throat clear, no lip or tongue edema.       Skin: No rashes or lesions  Psych: Alert and oriented times 3      Impression report and plan:  1.  Allergic rhinitis    Continue montelukast and antihistamine.  Notify of increased symptoms.  Suggested the use of sinus rinses.  She can follow as needed and with her primary care doctor in the future for refills.  "

## 2021-06-10 NOTE — PROGRESS NOTES
GENTRY  15w5d  Estimated Date of Delivery: 1/15/21    Chief Complaint   Patient presents with     Routine Prenatal Visit     15 weeks       S: Morning sickness resolved a few weeks ago. Now having more of an appetite.  She indicates she may be needing to transfer care up to [facility] if she takes a job opportunity up there.  Her father's aunt is ill and potentially needs a caretaker there.    Set up with [facility]IC already.  Her ex  Boyfriend Alexys texted her intermittently to try to be involved, however it sounds like he is also dating somebody else now and she is frustrated about this and does not need his help if he is not going to be involved.  She indicates she has good family support from both her own side and Alexys's family.    Interested to try breastfeeding but other family members haven't had as much success (her mom had low milk supply, grandma with unknown reason)    No vaginal bleeding, cramping, LOF.   No headaches, vision change, RUQ abdominal pain, LE swelling.       O: Vitals reviewed, see prenatal flowsheet for measurements.   appears well, no acute distress.  Normal respiratory effort.  Abdomen is soft. No LE swelling.     A/P:     Encounter for supervision of normal first pregnancy in second trimester  Single mom, young but good family supports; she is declining care coordination at this time.  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 by [facility] u/s on 7/16/2020   - intrauterine gestation at right horn  - [facility] recommends q4-6 weeks growth assessments  - Postpartum MRI recommended to eval for uterine structure   - Risk for malpresentation, fetal growth restriction reviewed    COVID-19 counseling:   - recommend iron supplementation due to blood shortage  - avoid travel, social distancing + hand  hygiene, avoid visitors at home  - current breastfeeding recommendations  - team based prenatal care by our [facility] group  - delivering physician may change  - visitor policy for imaging tests, office visits & L&D  - what to do if symptoms ([email or web address] or call the triage team at [phone])    Follow up: 4 weeks     Cora Kapadia MD
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
## 2021-06-11 NOTE — TELEPHONE ENCOUNTER
Patient calling, concerned that she is having a reaction to a nasal decongestant she started 2 weeks ago.    For the last 3 days, she has thrown up all food she has eaten.  She eats a meal a reported 5-6 times a day and snacks in between.  20-45 minutes after eating, everything comes back up.  She is having good urine output she reports, even when just drinking and it feels like everything comes back up.    Also of note, last night woke up as had a very bad sharp pain in side and that pain lasted for 1.5 hours.  This pain has not come back again.  Patient denies chills or any other symptoms.    Called clinic and spoke with team and Dr. Kapadia, advised that patient come to the clinic and they will see her.    Cora Morelos RN 09/04/20 1:57 PM           Reason for Disposition    MODERATE vomiting (e.g., 5 - 10 times / day) and present > 3 days    Additional Information    Negative: Vaginal bleeding and pregnant < 20 weeks    Negative: Abdominal pain and pregnant < 20 weeks    Negative: Headache is the main complaint    Negative: Sounds like a life-threatening emergency to the triager    Negative: Vomiting red blood or black (coffee ground) material    Negative: Insulin-dependent diabetes (Type I) and glucose > 400 mg/dL (22 mmol/L)    Negative: Recent head injury (within last 3 days)    Negative: Recent abdominal injury (within last 3 days)    Negative: Severe pain in one eye    Negative: SEVERE vomiting (e.g., > 10 times / day) and present > 8 hours    Negative: Unable to keep ANY liquids down (without vomiting) this past 24 hours    Negative: Weight loss > 5 pounds (2.5 kg) in last 2 weeks    Negative: Drinking very little and has signs of dehydration (e.g., no urine > 12 hours, very dry mouth)    Negative: Patient sounds very sick or weak to the triager    Protocols used: PREGNANCY - MORNING SICKNESS (NAUSEA AND VOMITING OF PREGNANCY)-A-[place]

## 2021-06-11 NOTE — PATIENT INSTRUCTIONS - HE
"Breast Pumps:  [email or web address]  Click on the pink button at top right of the webpage for the \"fast track order form\" and use the breast pump prescription that we provided today for this process.    \"[employer] [facility] is an accredited and authorized DME provider specializing in breast pumps and insurance billing. Most insurance plans cover a breast pump following the Affordable Care Act guidelines. We do all the hard work of contacting your insurance to verify your coverage and eligibility. We ship, free of charge, nation wide via UPS (1-5 business days) for all pump orders!\"          Childbirth Classes     [facility]  [email or web address]     Typically new parents consider the four main classes, but there are many others to choose from on the website:  1. Preparing for Childbirth $120  2. Breastfeeding $40  3. Newborns 101 $40  4. Infant CPR  $70      [facility]                                                [email or web address]  [phone]  [facility] providing pregnancy, labor and parenting support  Affordable birth classes, potentially covered by insurance        "

## 2021-06-11 NOTE — PROGRESS NOTES
ASSESSMENT:  1. Routine general medical examination at a health care facility  - Tdap vaccine,  8yo or older,  IM  - Meningococcal MCV4P  - Chlamydia trachomatis & Neisseria gonorrhoeae, Amplified Detection  - TB Skin Test; Future  - Glucose  - Lipid Cascade  - TB Skin Test    2. Intestinal malabsorption, unspecified type    PLAN:  There are no Patient Instructions on file for this visit.    Orders Placed This Encounter   Procedures     Chlamydia trachomatis & Neisseria gonorrhoeae, Amplified Detection     Order Specific Question:   Specimen Source?     Answer:   Urine     Tdap vaccine,  8yo or older,  IM     Order Specific Question:   Counseling provided to include answering patients questions and/or preemptively discussing the risks and benefits of all components.     Answer:   Yes     Meningococcal MCV4P     Order Specific Question:   Counseling provided to include answering patients questions and/or preemptively discussing the risks and benefits of all components.     Answer:   Yes     Glucose     Lipid Cascade     Order Specific Question:   Fasting is required?     Answer:   Yes     TB Skin Test     Standing Status:   Future     Number of Occurrences:   1     Standing Expiration Date:   7/9/2017     There are no discontinued medications.    No Follow-up on file.     Will get labs as part of a routine physical exam. Reviewed health maintenance with patient. Patient will get meningococcal and tetanus shots today. Patient will discuss with her parents about getting a HPV vaccine. Form will be completed for patient's work.     CHIEF COMPLAINT:  Chief Complaint   Patient presents with     Annual Exam     pt is fasting, has form for work       HISTORY OF PRESENT ILLNESS:  Kat is a 17 y.o. female presenting to the clinic today for an annual exam. She is fasting today. She has a form that she would like to have completed for work at a .     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   She takes montelukast as needed when her allergies act up. She has seasonal allergies at the beginning of spring and end of fall. She has tried nasal sprays in the past, but they cause her to have nose bleeds.   She last saw an eye doctor a couple years ago. She wears glasses.    She does not think that she snores. She denies chest pain and shortness of breath. She does not have constipation or diarrhea. She does not have swelling in her lower extremities. She denies knee and ankle pain. She does not have any urinary symptoms. All other systems are negative.    PFSH:  Family: She does NOT have a family history of heart disease. Her parents are doing well.   Social: She is a anh in high school. She will be working at a  over the summer.     Reviewed, as below.    History   Smoking Status     Never Smoker   Smokeless Tobacco     Never Used       Family: No pertinent past family history.     Social History     Social History     Marital status: Single     Spouse name: N/A     Number of children: N/A     Years of education: N/A     Occupational History     Not on file.     Social History Main Topics     Smoking status: Never Smoker     Smokeless tobacco: Never Used     Alcohol use Not on file     Drug use: Not on file     Sexual activity: Not on file     Other Topics Concern     Not on file     Social History Narrative     Surgical: No pertinent past surgical history.     No Known Allergies    Active Ambulatory Problems     Diagnosis Date Noted     Ankle Joint Pain      Ankle Sprain      Multiple Cranial Nerve Palsies      Allergic Rhinitis      Esophageal Reflux      Resolved Ambulatory Problems     Diagnosis Date Noted     No Resolved Ambulatory Problems     No Additional Past Medical History       Current Outpatient Prescriptions   Medication Sig Dispense Refill     etonogestrel 68 mg Impl Use as directed in the clinic today 1 each 0     montelukast  "(SINGULAIR) 10 mg tablet Take 1 tablet (10 mg total) by mouth at bedtime. 90 tablet 3     No current facility-administered medications for this visit.        VITALS:  Vitals:    06/09/17 1300   BP: 100/60   Pulse: 76   Weight: 140 lb 12.8 oz (63.9 kg)   Height: 5' 4.5\" (1.638 m)     Wt Readings from Last 3 Encounters:   06/09/17 140 lb 12.8 oz (63.9 kg) (78 %, Z= 0.77)*   05/19/17 142 lb (64.4 kg) (79 %, Z= 0.81)*   11/23/15 144 lb (65.3 kg) (84 %, Z= 0.99)*     * Growth percentiles are based on [facility] 2-20 Years data.     Body mass index is 23.8 kg/(m^2).    PHYSICAL EXAM:  General Appearance: Alert, cooperative, no distress, appears stated age  Head: Normocephalic, without obvious abnormality, atraumatic. No sinus tenderness.   Eyes: Pupils equal, symmetric  Ears: Normal TMs and external ear canals, both ears  Nose: Nares normal, septum midline, mucosa normal.   Throat: Lips, mucosa, and tongue normal; teeth and gums normal  Neck: Supple, symmetrical, trachea midline, no adenopathy;  thyroid: not enlarged, symmetric, no tenderness/mass/nodules  Lungs: Clear to auscultation bilaterally, respirations unlabored  Heart: Regular rate and rhythm, S1 and S2 normal, no murmur, rub, or gallop  Abdomen: Soft, non-tender, bowel sounds active all four quadrants, no masses, no organomegaly  Musculoskeletal: Normal range of motion. No joint swelling or deformity.   Extremities normal, atraumatic, no cyanosis or edema  Lymph nodes: Cervical nodes normal  Neurologic:  Alert and oriented times 3. Normal reflexes. Cranial nerves II-XII intact.   Psychiatric: Normal mood and affect.      ADDITIONAL HISTORY SUMMARIZED (2): None.  DECISION TO OBTAIN EXTRA INFORMATION (1): None.   RADIOLOGY TESTS (1): None.  LABS (1): Ordered labs.   MEDICINE TESTS (1): None.  INDEPENDENT REVIEW (2 each): None.       The visit lasted a total of 21 minutes face to face with the patient. Over 50% of the time was spent counseling and educating the patient " about health maintenance and overall health.    I, German Gill, am scribing for and in the presence of, Dr. Campos.    I, Dr. Campos, personally performed the services described in this documentation, as scribed by German Gill in my presence, and it is both accurate and complete.    MEDICATIONS:  Current Outpatient Prescriptions   Medication Sig Dispense Refill     etonogestrel 68 mg Impl Use as directed in the clinic today 1 each 0     montelukast (SINGULAIR) 10 mg tablet Take 1 tablet (10 mg total) by mouth at bedtime. 90 tablet 3     No current facility-administered medications for this visit.        Total data points: 1

## 2021-06-11 NOTE — PROGRESS NOTES
GENTRY  24w3d    Estimated Date of Delivery: 1/15/21    Chief Complaint   Patient presents with     Routine Prenatal Visit     24 weeks     S:  Doing well overall. Working a lot of hours.  She is going to take breastfeeding classes and parenting classes through her mom's work- November.   Her ex boyfriend is still not involved; doesn't want to be. She reports he took out a $13,000 loan for his truck so he can do some renovations there, but is not interested to care for the baby.  She does have plans for paternity testing and filing for full custody down the road.    No vaginal bleeding, cramping, LOF.   No headaches, vision change, RUQ abdominal pain, LE swelling.       O: Vitals reviewed, see prenatal flowsheet for measurements.   appears well, no acute distress.  Normal respiratory effort.   TraceLE swelling.     A/P:     Encounter for supervision of normal first pregnancy in second trimester  - Social: Single mom, young but good family supports; she is declining care coordination at this time, safe at home; FOB is Alexys; looking into paternity testing as they plan to go to court for custody   - Carrier screening, Progenity & MSAFP testing-  normal  - Expecting baby girl!  - Breastfeeding, Rx given   - Flu shot given  - O negative, will NEED rhogam @ 28wks   - anatomy scan repeated on 9/14, still sub optimal imaging of fetal profile but otherwise reassuring eval.  IMPRESSION:   1.  Single living intrauterine gestation.  2.  Based on first ultrasound, composite age of 22 weeks 3 days with EDC 1/15/2021.  3.  Normal interval growth.  4.  Normal-appearing intracranial structures, nose and lips. Suboptimal views of fetal profile due to fetal positioning.    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    COVID-19 counseling:   - recommend iron supplementation due to blood shortage  - avoid travel, social distancing + hand hygiene, avoid visitors at home  - current breastfeeding recommendations  - team based prenatal care by our [facility] group  - delivering physician may change  - visitor policy for imaging tests, office visits & L&D  - what to do if symptoms ([email or web address] or call the triage team at [phone])    Follow up: 4 weeks: 1hr GTT, CBC, syphilis, Rhogam, Tdap    Cora Kapadia MD

## 2021-06-11 NOTE — PROGRESS NOTES
GENTRY  21w0d    Estimated Date of Delivery: 1/15/21    Chief Complaint   Patient presents with     Emesis     since taking a decongestant she has not been able to keep anything down     S:    Patient called triage today and I advised coming in: concerned that she is having a reaction to a decongestant she started 2 weeks ago. I had advised flonase for her allergies and she also picked up sudafed. A few hours after she took it on Tuesday night she couldn't keep her food down. On Wednesday, when she eats or drinks and 45min later food comes back up.  On Thursday she was able to keep a little apple juice and a granola bar down.  Her last dose of Sudafed was Thursday evening.  Today is Friday. She tried to eat breakfast this AM and couldn't keep it down. She did have 1/2 bottle of water and some crackers on her way over and now this is staying down.     She reports her appetite is normal and would like to be eating and had attempted to eat about 5 or 6 times throughout the day on Wednesday but  20-45 minutes after eating, everything comes back up.  She is having good urine output she reports, even when just drinking and it feels like everything comes back up.      No bilious emesis; no hematemasis.  No diarrhea.   No fevers.   NO heartburn or reflux.    She does have some right flank pain, epigastric discomfort and sometimes with a headache; currently gone again.  Blood pressure here is normal.  No change in taste or smell.   No known COVID exposures.  She denies any vaginal discharge or urinary symptoms.     Also of note, last night woke up as had a very bad right sided sharp pain in side and that pain lasted for 1.5 hours.  This pain has not come back again.    Patient denies chills or any other symptoms.    No vaginal bleeding, cramping, LOF.   No headaches, vision change, RUQ abdominal pain, LE swelling.       O: Vitals reviewed, see prenatal flowsheet for measurements.   appears well, no acute distress.  Normal  respiratory effort.  Abdomen is soft, nondistended; normal bowel sounds, negative for guarding or rebound.  Negative Ramirez's sign, negative psoas sign.. No LE swelling.     A/P:     Nausea & vomiting with epigastric/right abdominal pain (this is currently resolved)  Nausea and vomiting associated with p.o. intake.  Differential reviewed with patient including possibility of med reaction as symptoms started acutely after her first dose of Sudafed and are now subsiding today as her last dose of Sudafed was yesterday evening.  She has not thrown up since this morning.  Some of the afternoon.  Given her symptoms I will start with some help labs, liver tests, lipase, and a UA just to rule out infection.  She is aware of symptoms and signs to present to the ER for more urgent evaluation but at this point I will hold off on any abdominal ultrasound imaging.  She denies any reflux or heartburn, no bilious or bloody emesis.  Given the absence of other symptoms at this point I would not recommend COVID screening in the setting of this medication exposure.    Encounter for supervision of normal first pregnancy in second trimester  - Social: Single mom, young but good family supports; she is declining care coordination at this time, safe at home; FOB is Alexys; looking into paternity testing as they plan to go to court for custody   - Desires Carrier screening, Progenity & MSAFP testing- these all returned normal  - Expecting baby girl!  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    COVID-19 counseling:   - recommend iron supplementation due to blood shortage  - avoid travel, social distancing + hand hygiene, avoid visitors at home  - current breastfeeding recommendations  - team based prenatal care by our [facility] group  - delivering physician may change  - visitor policy for imaging tests, office visits & L&D  - what to do if symptoms ([email or web address] or call the triage team at [phone])    Follow up: 4 weeks     Cora Kapadia MD

## 2021-06-12 NOTE — PROGRESS NOTES
"Kat Loera is a 20 y.o. female who is being evaluated via a billable video visit.      The patient has been notified of following:     \"This video visit will be conducted via a call between you and your physician/provider. We have found that certain health care needs can be provided without the need for an in-person physical exam.  This service lets us provide the care you need with a video conversation.  If a prescription is necessary we can send it directly to your pharmacy.  If lab work is needed we can place an order for that and you can then stop by our lab to have the test done at a later time.    Video visits are billed at different rates depending on your insurance coverage. Please reach out to your insurance provider with any questions.    If during the course of the call the physician/provider feels a video visit is not appropriate, you will not be charged for this service.\"    Patient has given verbal consent to a Video visit? Yes  How would you like to obtain your AVS? AVS Preference: [email or web address].  If dropped by the video visit, the video invitation should be sent to: Text to cell phone: [phone]  Will anyone else be joining your video visit? No          "

## 2021-06-12 NOTE — PROGRESS NOTES
VIDEO VISIT  Due to current COVID19 pandemic, pt was offered virtual visit in lieu of   an in office evaluation to limit exposures.      GENTRY  29w4d  Estimated Date of Delivery: 1/15/21    Chief Complaint   Patient presents with     Routine Prenatal Visit     29 weeks- would like to know risks in pregnancy if she were to get COVID- 1 hour GTT     Converted to virtual visit today as provider was required to be on virtual care today.    S: Doing well. She had some questions with regard to COVID in pregnancy that we reviewed today. She has no sx of COVID and no known exposures.    Planning for 1hr GTT and rhogam/Tdap today.    + fetal movement.   Denies vaginal bleeding, cramping, LOF.   No headaches, RUQ pain, vision change or LE swelling.    Objective:  Vitals - Patient Reported  Systolic (Patient Reported): 94  Diastolic (Patient Reported): 54  Blood pressure taken with manual cuff (will exclude from quality measure): Yes  Weight (Patient Reported): 169 lb 1.6 oz (76.7 kg)  Pulse (Patient Reported): 88  Vitals were obtained in clinic (Pt was there physically for her GTT test and BP checked/normal)  Fundal height and FHTs not done in context of virtual visit otherwie  Appears well, no acute distress.  Normal respiratory effort.  Abdomen is soft. No LE swelling.       A/P:     Encounter for supervision of normal first pregnancy in third trimester  - Social: Single mom, young but good family supports; she is declining care coordination at this time, safe at home; FOB is Alexys; looking into paternity testing as they plan to go to court for custody   - Carrier screening, Progenity & MSAFP testing-  normal  - Expecting baby girl!  - Breastfeeding, Rx given   - Flu shot given  - O negative, rhogam given 11/3  - anatomy scan completed, eventually we have seen full fetal profile on follow ups   - GTT, CBC, Sypillis, Tdap and Rhogam today  - next: child birth classes?    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 u/s on 7/16/2020; intrauterine gestation at right horn  - [facility] recommends q4-6 weeks growth assessments     -  9/14/2020 with EFW 27%ile     - 10/21/2020 with EFW 39%ile  - Postpartum MRI recommended to eval for uterine structure   - Risk for malpresentation, fetal growth restriction reviewed     Allergic rhinitis with additional component of pregnancy rhinitis   Trial of flonase is helping; ok to continue this.     COVID-19 counseling:   - recommend iron supplementation due to blood shortage  - avoid travel, social distancing + hand hygiene, avoid visitors at home  - current breastfeeding recommendations  - team based prenatal care by our [facility] group  - delivering physician may change  - visitor policy for imaging tests, office visits & L&D  - what to do if symptoms ([email or web address] or call the triage team at [phone])    Follow up: 2 weeks in person.     Video-Visit Details- see CA note for consent  Video visit start time: 3:10pm  Video visit end time: 3:17pm  Total time: 7min  Originating Location (pt. Location): Home  Distant Location (provider location):  [facility]   Mode of Communication:  Video Conference via [employer]    Cora Kapadia MD    Future Appointments   Date Time Provider Department Center   11/20/2020  1:40 PM Cora Kapadia MD [facility]   12/4/2020  1:40 PM Cora Kapadia MD [facility]   12/17/2020  2:00 PM Cora Kapadia MD [facility]   12/22/2020  2:00 PM Cora Kapadia MD [facility]   12/30/2020 11:40 AM Cora Kapadia MD [facility]   1/5/2021  2:00 PM Cora Kapadia MD [facility]   1/13/2021 12:00 PM Cora Kapadia MD [facility]   1/22/2021  2:00 PM Cora Kapadia MD [facility]

## 2021-06-13 NOTE — PROGRESS NOTES
"  Office Visit - Follow Up   Kat Wing   17 y.o. female    Date of Visit: 10/23/2017    Chief Complaint   Patient presents with     Follow-up     was informed mono test was positive   [facility]        Assessment and Plan   1. Mononucleosis  Explained to patient that there is a risk complications which include splenic rupture on airway obstructions.  Encourage patient to continue to observe no contact sports, rest, increase hydration and use analgesics.      History of Present Illness   This 17 y.o. old female patient who presented to the clinic today for follow-up after being seen at the emergency department and was diagnosed with mononucleosis.  She was also negative for strep.  Patient was informed to follow-up with her PCP for spleen check to avoid rupture.  Denied any other symptoms including abdominal discomfort, tenderness around her upper left quadrant, fever, andchills, she reported that she is feeling much better.    Review of Systems: A 12 point comprehensive review of systems was negative except as noted.     Medications, Allergies and Problem List   Reviewed and updated     Physical Exam   General Appearance: Well groomed    /70  Pulse 76  Temp 98.5  F (36.9  C) (Oral)   Ht 5' 4.5\" (1.638 m)  Wt 160 lb 9.6 oz (72.8 kg)  SpO2 98%  BMI 27.14 kg/m2  Physical Examination: General appearance - alert, well appearing, and in no distress  Eyes: pupils equal and reactive, extraocular eye movements intact  Mouth: mucous membranes moist, pharynx normal without lesions  Neck: supple, no significant adenopathy  Abdomen: soft, nontender, nondistended, no masses or organomegaly  Neurological: alert, oriented, normal speech, no focal findings or movement disorder noted  Extremities: No edema, no clubbing or cyanosis  Psychiatric: Normal affect. Does not appear anxious or depressed.       Additional Information   Current Outpatient Prescriptions   Medication Sig Dispense Refill     etonogestrel 68 mg " Impl Use as directed in the clinic today 1 each 0     montelukast (SINGULAIR) 10 mg tablet Take 1 tablet (10 mg total) by mouth at bedtime. 90 tablet 3     No current facility-administered medications for this visit.      No Known Allergies  Social History   Substance Use Topics     Smoking status: Never Smoker     Smokeless tobacco: Never Used     Alcohol use None       Time: total time spent with the patient was 15 minutes of which >50% was spent in counseling and coordination of care     Sharita Klein, CNP

## 2021-06-13 NOTE — PROGRESS NOTES
GENTRY  32w0d  Estimated Date of Delivery: 1/15/21    Chief Complaint   Patient presents with     Routine Prenatal Visit     32 week       S:  Noticing a lot of vaginal discharge- mild odor, no itching. No sexual activity in months. Denies concern for STD  Her mom is helping her complete some online Vet[employer] classes and watching videos to prepare for labor.    + fetal movement.   No vaginal bleeding, cramping, LOF.   No headaches, vision change, RUQ abdominal pain, LE swelling.       O: Vitals reviewed, see prenatal flowsheet for measurements.   appears well, no acute distress.  Normal respiratory effort.  Abdomen is soft. Trace LE swelling. Creamy white vaginal discharge with mild odor.    A/P:       Encounter for supervision of normal first pregnancy in third trimester  - Social: Single mom, young but good family supports; she is declining care coordination at this time, safe at home; FOB is Alexys; looking into paternity testing as they plan to go to court for custody   - Carrier screening, Progenity & MSAFP testing-  normal  - Expecting baby girl!  - Breastfeeding, Rx given   - Flu shot given  - O negative, rhogam given 11/3  - anatomy scan completed, eventually we have seen full fetal profile on follow ups   - GTT, CBC, Sypillis, Tdap and Rhogam done     Uterine anomaly  Possible bicornuate vs partially septate uterus by [facility] u/s on 7/16/2020; intrauterine gestation at right horn  - [facility] recommends q4-6 weeks growth assessments     -  9/14/2020 with EFW 27%ile     - 10/21/2020 with EFW 39%ile    - orders placed for the next 2 ultrasounds so she can schedule this  - Postpartum MRI recommended to eval for uterine structure   - Risk for malpresentation, fetal growth restriction reviewed     Allergic rhinitis with additional component of pregnancy rhinitis   Trial of flonase is helping; ok to continue this.      Vaginal discharge  Dx with BV today (clue cells on wet prep); Rx for flagyl sent  - Wet Prep,  Vaginal      COVID-19 counseling:   - recommend iron supplementation due to blood shortage  - avoid travel, social distancing + hand hygiene, avoid visitors at home  - current breastfeeding recommendations  - team based prenatal care by our [facility] group  - delivering physician may change  - visitor policy for imaging tests, office visits & L&D  - what to do if symptoms ([email or web address] or call the triage team at [phone])    Follow up: 2 weeks     Cora Kapadia MD    Future Appointments   Date Time Provider Department Center   12/4/2020  1:40 PM Cora Kapadia MD [facility]   12/17/2020  2:00 PM Cora Kapadia MD [facility]   12/22/2020  2:00 PM Cora Kapadia MD [facility]   12/30/2020 11:40 AM Cora Kapadia MD [facility]   1/5/2021  2:00 PM Cora Kapadia MD [facility]   1/13/2021 12:00 PM Cora Kapadia MD [facility]   1/22/2021  2:00 PM Cora Kapadia MD [facility]

## 2021-06-13 NOTE — TELEPHONE ENCOUNTER
Called the pharmacy to verify that they received the prescription. They have and will notify the patient.

## 2021-06-13 NOTE — PROGRESS NOTES
"Please see \"Imaging\" tab under \"Chart Review\" for details of today's visit.    Andres Vergara        "
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
## 2021-06-16 PROBLEM — O09.73 SUPERVISION OF HIGH RISK PREGNANCY DUE TO SOCIAL PROBLEMS IN THIRD TRIMESTER: Status: ACTIVE | Noted: 2020-11-03

## 2021-06-16 PROBLEM — Q51.9 UTERINE ANOMALY: Status: ACTIVE | Noted: 2020-08-26

## 2021-06-16 PROBLEM — Z34.02 ENCOUNTER FOR SUPERVISION OF NORMAL FIRST PREGNANCY IN SECOND TRIMESTER: Status: ACTIVE | Noted: 2020-07-29

## 2021-06-20 NOTE — LETTER
Letter by Cora Kapadia MD at      Author: Cora Kapadia MD Service: -- Author Type: --    Filed:  Encounter Date: 9/4/2020 Status: (Other)         September 4, 2020     Patient: Kat Loera   YOB: 2000   Date of Visit: 9/4/2020       To Whom It May Concern:    It is my medical opinion that Kat Loera may return to work on September 7th, 2020..    If you have any questions or concerns, please don't hesitate to call.    Sincerely,        Electronically signed by Cora Kapadia MD

## 2021-06-21 NOTE — LETTER
Letter by Cora Kapadia MD at      Author: Cora Kapadia MD Service: -- Author Type: --    Filed:  Encounter Date: 12/4/2020 Status: (Other)         December 4, 2020     Patient: Kat Loera   YOB: 2000   Date of Visit: 12/4/2020       To Whom it May Concern:    Kat Loera was seen in my clinic on 12/4/2020. It is my opinion that she is cleared to return to work 12/5/2020 without restrictions.    If you have any questions or concerns, please don't hesitate to call.    Sincerely,         Electronically signed by Cora Kapadia MD